# Patient Record
Sex: FEMALE | Race: WHITE | NOT HISPANIC OR LATINO | Employment: FULL TIME | URBAN - METROPOLITAN AREA
[De-identification: names, ages, dates, MRNs, and addresses within clinical notes are randomized per-mention and may not be internally consistent; named-entity substitution may affect disease eponyms.]

---
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
## 2018-05-15 ENCOUNTER — OFFICE VISIT (OUTPATIENT)
Dept: MEDICAL GROUP | Facility: PHYSICIAN GROUP | Age: 18
End: 2018-05-15
Payer: COMMERCIAL

## 2018-05-15 VITALS
TEMPERATURE: 97.2 F | HEIGHT: 68 IN | DIASTOLIC BLOOD PRESSURE: 80 MMHG | RESPIRATION RATE: 16 BRPM | WEIGHT: 155 LBS | BODY MASS INDEX: 23.49 KG/M2 | OXYGEN SATURATION: 98 % | HEART RATE: 105 BPM | SYSTOLIC BLOOD PRESSURE: 118 MMHG

## 2018-05-15 DIAGNOSIS — Z11.3 ROUTINE SCREENING FOR STI (SEXUALLY TRANSMITTED INFECTION): ICD-10-CM

## 2018-05-15 DIAGNOSIS — Z00.00 WELLNESS EXAMINATION: ICD-10-CM

## 2018-05-15 DIAGNOSIS — Z23 NEED FOR VACCINATION: ICD-10-CM

## 2018-05-15 PROCEDURE — 99395 PREV VISIT EST AGE 18-39: CPT | Mod: 25 | Performed by: PHYSICIAN ASSISTANT

## 2018-05-15 PROCEDURE — 90734 MENACWYD/MENACWYCRM VACC IM: CPT | Performed by: PHYSICIAN ASSISTANT

## 2018-05-15 PROCEDURE — 90471 IMMUNIZATION ADMIN: CPT | Performed by: PHYSICIAN ASSISTANT

## 2018-05-15 PROCEDURE — 90472 IMMUNIZATION ADMIN EACH ADD: CPT | Performed by: PHYSICIAN ASSISTANT

## 2018-05-15 PROCEDURE — 90621 MENB-FHBP VACC 2/3 DOSE IM: CPT | Performed by: PHYSICIAN ASSISTANT

## 2018-05-15 ASSESSMENT — PAIN SCALES - GENERAL: PAINLEVEL: NO PAIN

## 2018-05-15 ASSESSMENT — PATIENT HEALTH QUESTIONNAIRE - PHQ9
5. POOR APPETITE OR OVEREATING: 0 - NOT AT ALL
CLINICAL INTERPRETATION OF PHQ2 SCORE: 3
SUM OF ALL RESPONSES TO PHQ QUESTIONS 1-9: 5

## 2018-05-15 NOTE — PROGRESS NOTES
Chief Complaint   Patient presents with   • Annual Exam     check up       HISTORY OF PRESENT ILLNESS: Tawny Meyers is an established 18 y.o. female here to discuss the evaluation and management of:    Wellness examination  Patient is here today for her annual wellness examination. States she feels an overall good health and has no complaints. Patient is due for Trumenba and Menactra vaccinations. She is also due for chlamydia screening. States diet is healthy. Denies having regular exercise routine. States she is a sophomore in college and has passing grades. States she still following up with her psychiatrist regularly for depression and anxiety. She is seen by a psychiatrist, Dr. Thurman,  [facility] [place]. Is currently taking Effexor 225 mg po q night and Remeron 30 mg tablet q night. Medications are managed by psychiatrist. States she is compliant with medications. Admits that Remeron has caused weight gain. Patient has gained 25 lbs since last wellness exam on 17. Denies homicidal or suicidal ideation.      Patient Active Problem List    Diagnosis Date Noted   • Wellness examination 2017       Allergies:Patient has no known allergies.    Current Outpatient Prescriptions   Medication Sig Dispense Refill   • Venlafaxine HCl (EFFEXOR PO) Take 225 mg by mouth every day.     • mirtazapine (REMERON) 15 MG Tab Take 15 mg by mouth every evening.       No current facility-administered medications for this visit.        Social History   Substance Use Topics   • Smoking status: Never Smoker   • Smokeless tobacco: Never Used   • Alcohol use No       Family Status   Relation Status   • Mother Alive   • Father Alive   • Brother Alive   • Brother Alive   • Brother Alive   • Brother Alive   • Brother Alive   • Sister Alive   • Maternal Grandmother    • Maternal Grandfather    • Paternal Grandmother    • Paternal Grandfather      Family History   Problem Relation Age of Onset   • No  "Known Problems Mother    • Hypertension Father    • No Known Problems Brother    • No Known Problems Brother    • No Known Problems Brother    • No Known Problems Brother    • No Known Problems Brother    • Genetic Sister      Down Syndrome        ROS:  Review of Systems   Constitutional: Negative for fever, chills, weight loss and malaise/fatigue. Positive for weight gain.  HENT: Negative for ear pain, nosebleeds, congestion, sore throat and neck pain.    Eyes: Negative for blurred vision.   Respiratory: Negative for cough, sputum production, shortness of breath and wheezing.    Cardiovascular: Negative for chest pain, palpitations, orthopnea and leg swelling.   Gastrointestinal: Negative for heartburn, nausea, vomiting and abdominal pain.   Genitourinary: Negative for dysuria, urgency and frequency.   Musculoskeletal: Negative for myalgias, back pain and joint pain.   Skin: Negative for rash and itching.   Neurological: Negative for dizziness, tingling, tremors, sensory change, focal weakness and headaches.   Endo/Heme/Allergies: Does not bruise/bleed easily.   Psychiatric/Behavioral: Negative for suicidal ideas and memory loss.  The patient does not have insomnia.  Positive for depression and anxiety.  All other systems reviewed and are negative except as in HPI.    Exam: Blood pressure 118/80, pulse (!) 105, temperature 36.2 °C (97.2 °F), resp. rate 16, height 1.715 m (5' 7.5\"), weight 70.3 kg (155 lb), last menstrual period 04/24/2018, SpO2 98 %, not currently breastfeeding. Body mass index is 23.92 kg/m².  General: Normal appearing. No distress.  HEENT: Normocephalic. Eyes conjunctiva clear lids without ptosis, pupils equal and reactive to light accommodation, ears normal shape and contour, canals are clear bilaterally, tympanic membranes are benign, nasal mucosa benign, oropharynx is without erythema, edema or exudates.   Neck: Supple without JVD or bruit. Thyroid is not enlarged.  Pulmonary: Clear to " ausculation.  Normal effort. No rales, ronchi, or wheezing.  Cardiovascular: Regular rate and rhythm without murmur. Carotid pulses are intact and equal bilaterally.  Abdomen: Soft, nontender, nondistended. Normal bowel sounds. Liver and spleen are not palpable  Neurologic: Grossly nonfocal.  Cranial nerves are normal. LE DTR's normal and symmetric.  Lymph: No cervical, supraclavicular or axillary lymph nodes are palpable  Skin: Warm and dry.  No rashes or suspicious skin lesions.  Musculoskeletal: Normal gait. No extremity cyanosis, clubbing, or edema.  Psych: Normal mood and affect. Alert and oriented x3. Judgment and insight is normal.    Medical decision-making and discussion:  1. Wellness examination  Patient has gained 25 lbs since last wellness visit on 5/12/17. Patient is currently taking 30 mg every Remeron once daily. States medication has caused weight gain. Patient denies having a regular exercise routine. Emphasized the importance of diet and exercise with patient.  - Encouraged diet high in fruits, vegetables, and fiber. And a diet low in salt, refined carbohydrates, cholesterol, saturated fat, and trans fatty acids.    - Encouraged  a minimum of 30 minutes of moderate intensity aerobic exercise (eg, brisk walking) is recommended on five days each week. Or 20 minutes of vigorous-intensity aerobic exercise (eg, jogging) on three days each week.     Advised patient to continue following up with psychiatry as indicated. Continue current medication regimen for anxiety and depression. Denies homicidal or suicidal ideation. Discussed that should the patient have any symptoms they should call suicide prevention hotline or report to the emergency room immediately.    Patient states she is due for an annual eye exam and plans on scheduling appointment in the near future. States she has not had a dental exam in over a year. Discussed the importance of regular dental exams with patient.      Chlamydia/gonorrhea  lab work has been ordered for patient to complete. Patient will be contacted with results.    - CHLAMYDIA/GC PCR URINE OR SWAB; Future  - MENINGOCOCCAL CONJUGATE VACCINE 4-VALENT IM  - MENING VAC SERO B 2-3 DOSE SCHED IM    2. Routine screening for STI (sexually transmitted infection)  Discussed the importance of being screened for chlamydia/gonorrhea with patient. Lab work has been ordered. Patient will be contacted results. If they're abnormal findings patient will be treated at that time.  - CHLAMYDIA/GC PCR URINE OR SWAB; Future    3. Need for vaccination  A Menactra and Trumenba vaccination was administered to patient without complication. A VIS form was provided to patient.     - MENINGOCOCCAL CONJUGATE VACCINE 4-VALENT IM  - MENING VAC SERO B 2-3 DOSE SCHED IM      Please note that this dictation was created using voice recognition software. I have made every reasonable attempt to correct obvious errors, but I expect that there are errors of grammar and possibly content that I did not discover before finalizing the note.        Return in about 1 year (around 5/15/2019).

## 2018-05-15 NOTE — ASSESSMENT & PLAN NOTE
Patient is here today for her annual wellness examination. States she feels an overall good health and has no complaints. Patient is due for Trumenba and Menactra vaccinations. She is also due for chlamydia screening. States diet is healthy. Denies having regular exercise routine. States she is a sophomore in college and has passing grades. States she still following up with her psychiatrist regularly for depression and anxiety. She is seen by a psychiatrist, Dr. Thurman,  [facility] [place]. Is currently taking Effexor 225 mg po q night and Remeron 30 mg tablet q night. States she is compliant with medications and experiences no side effects or complications from medications. Medications are managed by psychiatrist. Denies homicidal or suicidal ideation.

## 2022-03-24 ENCOUNTER — NON-PROVIDER VISIT (OUTPATIENT)
Dept: URGENT CARE | Facility: CLINIC | Age: 22
End: 2022-03-24

## 2022-03-24 ENCOUNTER — OFFICE VISIT (OUTPATIENT)
Dept: URGENT CARE | Facility: CLINIC | Age: 22
End: 2022-03-24

## 2022-03-24 DIAGNOSIS — Z11.1 ENCOUNTER FOR PPD TEST: ICD-10-CM

## 2022-03-24 PROCEDURE — 86580 TB INTRADERMAL TEST: CPT | Performed by: PHYSICIAN ASSISTANT

## 2022-03-24 PROCEDURE — 99999 PR NO CHARGE: CPT | Performed by: PHYSICIAN ASSISTANT

## 2022-03-24 NOTE — NON-PROVIDER
Tawny Meyers is a 22 y.o. female here for a non-provider visit for PPD placement -- Step 1 of 1    Reason for PPD:  work requirement    1. TB evaluation questionnaire completed by patient? Yes      -  If any answers marked yes did you contact a provider prior to placing? No  2.  Patient notified to return to clinic for reading on: On Saturday, 03/26/2022 arrive after 2:41 pm or Sunday, 03/27/2022 arrive before 2:41pm.   3.  PPD Placement documentation completed on TB evaluation questionnaire? Yes  4.  Location of TB evaluation questionnaire filed: [facility] [address] [place] [address].   (note: If the urgent care site if closed for any reason, the patient can go to another urgent care to get it TB read).

## 2022-03-25 NOTE — NON-PROVIDER
.MAReason for PPD:  work requirement     1. TB evaluation questionnaire completed by patient? Yes      -  If any answers marked yes did you contact a provider prior to placing? No  2.  Patient notified to return to clinic for reading on: On Saturday, 03/26/2022 arrive after 2:41 pm or Sunday, 03/27/2022 arrive before 2:41pm.   3.  PPD Placement documentation completed on TB evaluation questionnaire? Yes  4.  Location of TB evaluation questionnaire filed: [facility] [address] [place] [address].   (note: If the urgent care site if closed for any reason, the patient can go to another urgent care to get it TB read).

## 2022-03-26 ENCOUNTER — NON-PROVIDER VISIT (OUTPATIENT)
Dept: URGENT CARE | Facility: CLINIC | Age: 22
End: 2022-03-26

## 2022-03-26 LAB — TB WHEAL 3D P 5 TU DIAM: NORMAL MM

## 2022-03-26 NOTE — NON-PROVIDER
Tawny Meyers is a 22 y.o. female here for a non-provider visit for PPD reading -- Step 1 of 1.      1.  Resulted in Epic under enter/edit results? Yes   2.  TB evaluation questionnaire scanned into chart and original given to patient?Yes    3. Was induration greater than 0 mm? No.      Routed to PCP? No

## 2024-03-19 ENCOUNTER — NON-PROVIDER VISIT (OUTPATIENT)
Dept: URGENT CARE | Facility: CLINIC | Age: 24
End: 2024-03-19

## 2024-03-19 DIAGNOSIS — Z11.1 PPD SCREENING TEST: ICD-10-CM

## 2024-03-19 PROCEDURE — 86580 TB INTRADERMAL TEST: CPT | Performed by: NURSE PRACTITIONER

## 2024-03-20 NOTE — NON-PROVIDER
Tawny Meyers is a 23 y.o. female here for a non-provider visit for PPD placement -- Step 1 of 1    Reason for PPD:  work requirement    1. TB evaluation questionnaire completed by patient? Yes      -  If any answers marked yes did you contact a provider prior to placing? Not Indicated  2.  Patient notified to return to clinic for reading on: 03/21/2024 after 5:09pm through 03/22/2024 before 5:09pm  3.  PPD Placement documentation completed on TB evaluation questionnaire? Yes  4.  Location of TB evaluation questionnaire filed: [facility] station

## 2024-03-21 ENCOUNTER — NON-PROVIDER VISIT (OUTPATIENT)
Dept: URGENT CARE | Facility: CLINIC | Age: 24
End: 2024-03-21

## 2024-06-18 ENCOUNTER — RESEARCH ENCOUNTER (OUTPATIENT)
Dept: RESEARCH | Facility: MEDICAL CENTER | Age: 24
End: 2024-06-18

## 2025-07-23 ENCOUNTER — APPOINTMENT (OUTPATIENT)
Dept: MEDICAL GROUP | Facility: MEDICAL CENTER | Age: 25
End: 2025-07-23

## 2025-07-23 VITALS
HEART RATE: 70 BPM | HEIGHT: 68 IN | BODY MASS INDEX: 22.72 KG/M2 | OXYGEN SATURATION: 100 % | WEIGHT: 149.91 LBS | SYSTOLIC BLOOD PRESSURE: 112 MMHG | TEMPERATURE: 98.6 F | DIASTOLIC BLOOD PRESSURE: 72 MMHG

## 2025-07-23 DIAGNOSIS — Z13.6 ENCOUNTER FOR LIPID SCREENING FOR CARDIOVASCULAR DISEASE: ICD-10-CM

## 2025-07-23 DIAGNOSIS — Z23 ENCOUNTER FOR IMMUNIZATION: ICD-10-CM

## 2025-07-23 DIAGNOSIS — S39.012D STRAIN OF LUMBAR REGION, SUBSEQUENT ENCOUNTER: ICD-10-CM

## 2025-07-23 DIAGNOSIS — Z13.228 ENCOUNTER FOR SCREENING FOR METABOLIC DISORDER: ICD-10-CM

## 2025-07-23 DIAGNOSIS — Z13.220 ENCOUNTER FOR LIPID SCREENING FOR CARDIOVASCULAR DISEASE: ICD-10-CM

## 2025-07-23 DIAGNOSIS — Z00.00 ENCOUNTER FOR PREVENTIVE CARE: Primary | ICD-10-CM

## 2025-07-23 PROBLEM — S39.012A STRAIN OF LUMBAR REGION: Status: ACTIVE | Noted: 2025-07-23

## 2025-07-23 PROCEDURE — 99395 PREV VISIT EST AGE 18-39: CPT | Mod: 25 | Performed by: STUDENT IN AN ORGANIZED HEALTH CARE EDUCATION/TRAINING PROGRAM

## 2025-07-23 PROCEDURE — 3074F SYST BP LT 130 MM HG: CPT | Performed by: STUDENT IN AN ORGANIZED HEALTH CARE EDUCATION/TRAINING PROGRAM

## 2025-07-23 PROCEDURE — 90471 IMMUNIZATION ADMIN: CPT | Performed by: STUDENT IN AN ORGANIZED HEALTH CARE EDUCATION/TRAINING PROGRAM

## 2025-07-23 PROCEDURE — 90621 MENB-FHBP VACC 2/3 DOSE IM: CPT | Mod: JZ | Performed by: STUDENT IN AN ORGANIZED HEALTH CARE EDUCATION/TRAINING PROGRAM

## 2025-07-23 PROCEDURE — 3078F DIAST BP <80 MM HG: CPT | Performed by: STUDENT IN AN ORGANIZED HEALTH CARE EDUCATION/TRAINING PROGRAM

## 2025-07-23 SDOH — ECONOMIC STABILITY: INCOME INSECURITY: IN THE LAST 12 MONTHS, WAS THERE A TIME WHEN YOU WERE NOT ABLE TO PAY THE MORTGAGE OR RENT ON TIME?: NO

## 2025-07-23 SDOH — ECONOMIC STABILITY: FOOD INSECURITY: WITHIN THE PAST 12 MONTHS, THE FOOD YOU BOUGHT JUST DIDN'T LAST AND YOU DIDN'T HAVE MONEY TO GET MORE.: NEVER TRUE

## 2025-07-23 SDOH — ECONOMIC STABILITY: FOOD INSECURITY: WITHIN THE PAST 12 MONTHS, YOU WORRIED THAT YOUR FOOD WOULD RUN OUT BEFORE YOU GOT MONEY TO BUY MORE.: NEVER TRUE

## 2025-07-23 SDOH — ECONOMIC STABILITY: TRANSPORTATION INSECURITY
IN THE PAST 12 MONTHS, HAS THE LACK OF TRANSPORTATION KEPT YOU FROM MEDICAL APPOINTMENTS OR FROM GETTING MEDICATIONS?: NO

## 2025-07-23 SDOH — ECONOMIC STABILITY: INCOME INSECURITY: HOW HARD IS IT FOR YOU TO PAY FOR THE VERY BASICS LIKE FOOD, HOUSING, MEDICAL CARE, AND HEATING?: NOT HARD AT ALL

## 2025-07-23 SDOH — HEALTH STABILITY: PHYSICAL HEALTH

## 2025-07-23 SDOH — ECONOMIC STABILITY: TRANSPORTATION INSECURITY
IN THE PAST 12 MONTHS, HAS LACK OF RELIABLE TRANSPORTATION KEPT YOU FROM MEDICAL APPOINTMENTS, MEETINGS, WORK OR FROM GETTING THINGS NEEDED FOR DAILY LIVING?: NO

## 2025-07-23 SDOH — ECONOMIC STABILITY: TRANSPORTATION INSECURITY
IN THE PAST 12 MONTHS, HAS LACK OF TRANSPORTATION KEPT YOU FROM MEETINGS, WORK, OR FROM GETTING THINGS NEEDED FOR DAILY LIVING?: NO

## 2025-07-23 SDOH — HEALTH STABILITY: MENTAL HEALTH

## 2025-07-23 SDOH — ECONOMIC STABILITY: HOUSING INSECURITY
IN THE LAST 12 MONTHS, WAS THERE A TIME WHEN YOU DID NOT HAVE A STEADY PLACE TO SLEEP OR SLEPT IN A SHELTER (INCLUDING NOW)?: NO

## 2025-07-23 ASSESSMENT — ENCOUNTER SYMPTOMS
FEVER: 0
VOMITING: 0
TREMORS: 0
COUGH: 0
HEMOPTYSIS: 0
SENSORY CHANGE: 0
ABDOMINAL PAIN: 0
DIARRHEA: 0
PALPITATIONS: 0
SPEECH CHANGE: 0
CHILLS: 0

## 2025-07-23 ASSESSMENT — SOCIAL DETERMINANTS OF HEALTH (SDOH)
HOW OFTEN DO YOU GET TOGETHER WITH FRIENDS OR RELATIVES?: TWICE A WEEK
HOW MANY DRINKS CONTAINING ALCOHOL DO YOU HAVE ON A TYPICAL DAY WHEN YOU ARE DRINKING: 1 OR 2
IN THE PAST 12 MONTHS, HAS THE ELECTRIC, GAS, OIL, OR WATER COMPANY THREATENED TO SHUT OFF SERVICE IN YOUR HOME?: NO
ARE YOU MARRIED, WIDOWED, DIVORCED, SEPARATED, NEVER MARRIED, OR LIVING WITH A PARTNER?: NEVER MARRIED
HOW OFTEN DO YOU HAVE SIX OR MORE DRINKS ON ONE OCCASION: NEVER
IN A TYPICAL WEEK, HOW MANY TIMES DO YOU TALK ON THE PHONE WITH FAMILY, FRIENDS, OR NEIGHBORS?: MORE THAN THREE TIMES A WEEK
ARE YOU MARRIED, WIDOWED, DIVORCED, SEPARATED, NEVER MARRIED, OR LIVING WITH A PARTNER?: NEVER MARRIED
HOW OFTEN DO YOU ATTEND CHURCH OR RELIGIOUS SERVICES?: NEVER
WITHIN THE PAST 12 MONTHS, YOU WORRIED THAT YOUR FOOD WOULD RUN OUT BEFORE YOU GOT THE MONEY TO BUY MORE: NEVER TRUE
IN A TYPICAL WEEK, HOW MANY TIMES DO YOU TALK ON THE PHONE WITH FAMILY, FRIENDS, OR NEIGHBORS?: MORE THAN THREE TIMES A WEEK
HOW OFTEN DO YOU ATTEND CHURCH OR RELIGIOUS SERVICES?: NEVER
HOW OFTEN DO YOU HAVE A DRINK CONTAINING ALCOHOL: 2-4 TIMES A MONTH
HOW HARD IS IT FOR YOU TO PAY FOR THE VERY BASICS LIKE FOOD, HOUSING, MEDICAL CARE, AND HEATING?: NOT HARD AT ALL
HOW OFTEN DO YOU GET TOGETHER WITH FRIENDS OR RELATIVES?: TWICE A WEEK

## 2025-07-23 ASSESSMENT — LIFESTYLE VARIABLES
SKIP TO QUESTIONS 9-10: 1
HOW OFTEN DO YOU HAVE SIX OR MORE DRINKS ON ONE OCCASION: NEVER
HOW OFTEN DO YOU HAVE A DRINK CONTAINING ALCOHOL: 2-4 TIMES A MONTH
AUDIT-C TOTAL SCORE: 2
HOW MANY STANDARD DRINKS CONTAINING ALCOHOL DO YOU HAVE ON A TYPICAL DAY: 1 OR 2

## 2025-07-23 ASSESSMENT — PATIENT HEALTH QUESTIONNAIRE - PHQ9: CLINICAL INTERPRETATION OF PHQ2 SCORE: 0

## 2025-07-23 NOTE — PROGRESS NOTES
Tawny Meyers is a 25 y.o. old female  who is here for annual and establish care    Chief Complaint   Patient presents with    Establish Care     New to You     Other     Patient would like to work on required vaccines// preventive labs       History of Present Illness  The patient presents for a new patient visit.    She was diagnosed with depression a couple of years ago and was treated with venlafaxine. She has been off medications and has not seen a psychiatrist for a couple of years, reporting improvement in her condition.     She experienced left hip pain for several months, which resolved after a few days of yoga and avoiding prolonged sitting and heavy lifting at work. She also reported numbness in her left leg last year, which was more pronounced than in her right leg. The numbness would increase when sitting and cause significant pain upon standing. She also reports mild lower back pain on the same side, which was present during her hip pain episodes.    Social History:  Occupations:       Ob-Gyn/ History:    Last Pap Smear:  Patient has not had a pap smear yet. No history of abnormal pap smears.  We will schedule her for Pap smear      GYNECOLOGICAL HISTORY:  Frequency and Flow: Regular, sometimes varying by a day or two      Health Maintenance  Below Anticipatory guidance discussed with patient  Cholesterol Screening: Obtain lipid panel  Diabetes Screening: Obtain CMP  Diet: Pescatarian diet   Exercise: Walking,  approximately 10,000 steps daily  Substance Abuse: none       Cancer screening  Colorectal Cancer Screening: n/a    Lung Cancer Screening: n/a    Breast Cancer Screening: n/a  Prostate Cancer Screening/PSA: n/a       Infectious disease screening/Immunizations  --HIV Screening: obtain   --Hepatitis C Screening: obtain   --Immunizations: Second dose of Trumenba today  Tdap up-to-date       Review of Systems   Constitutional:  Negative for chills and fever.   Respiratory:   "Negative for cough and hemoptysis.    Cardiovascular:  Negative for chest pain and palpitations.   Gastrointestinal:  Negative for abdominal pain, diarrhea and vomiting.   Genitourinary:  Negative for dysuria.   Neurological:  Negative for tremors, sensory change and speech change.        He  has a past medical history of Depression.  He  has no past surgical history on file.  Family History   Problem Relation Age of Onset    No Known Problems Mother     Hypertension Father     Genetic Disorder Sister         Down Syndrome     No Known Problems Brother     No Known Problems Brother     No Known Problems Brother     No Known Problems Brother     No Known Problems Brother     Diabetes Maternal Grandmother     Heart Disease Maternal Grandmother     Glaucoma Maternal Grandmother     Heart Disease Maternal Grandfather     Hypertension Maternal Grandfather     Pancreatic Cancer Paternal Grandmother      Social History[1]  Patient Active Problem List    Diagnosis Date Noted    Strain of lumbar region 07/23/2025    Wellness examination 05/12/2017     Current Medications[2] (including changes today)  Allergies: Patient has no active allergies.    /72 (BP Location: Left arm, Patient Position: Sitting, BP Cuff Size: Adult)   Pulse 70   Temp 37 °C (98.6 °F) (Temporal)   Ht 1.727 m (5' 8\")   Wt 68 kg (149 lb 14.6 oz)   SpO2 100%      Physical Exam  Constitutional:       Appearance: Normal appearance.   HENT:      Right Ear: Tympanic membrane, ear canal and external ear normal.      Left Ear: Tympanic membrane, ear canal and external ear normal.   Eyes:      General:         Right eye: No discharge.         Left eye: No discharge.      Extraocular Movements: Extraocular movements intact.      Pupils: Pupils are equal, round, and reactive to light.   Cardiovascular:      Rate and Rhythm: Normal rate and regular rhythm.      Pulses: Normal pulses.      Heart sounds: Normal heart sounds. No murmur heard.  Pulmonary:      " Effort: Pulmonary effort is normal. No respiratory distress.      Breath sounds: Normal breath sounds. No wheezing.   Abdominal:      Palpations: Abdomen is soft.      Tenderness: There is no abdominal tenderness. There is no guarding.   Skin:     General: Skin is warm.      Coloration: Skin is not jaundiced.   Neurological:      General: No focal deficit present.      Mental Status: She is alert and oriented to person, place, and time.   Psychiatric:         Mood and Affect: Mood normal.            Assessment and plan:  Problem List Items Addressed This Visit       Strain of lumbar region    - Resolved with stretching exercises  - Continue to monitor          Other Visit Diagnoses         Encounter for preventive care    -  Primary    Relevant Orders    HIV AG/AB COMBO ASSAY SCREENING    HEP C VIRUS ANTIBODY    CBC WITH DIFFERENTIAL    VITAMIN D,25 HYDROXY (DEFICIENCY)      Encounter for lipid screening for cardiovascular disease        Relevant Orders    Lipid Profile      Encounter for screening for metabolic disorder        Relevant Orders    Comp Metabolic Panel    TSH WITH REFLEX TO FT4      Encounter for immunization        Relevant Orders    Meningococcal (IM) Group B (Completed)                   Return if symptoms worsen or fail to improve.        Please note that this dictation was created using voice recognition software. I have made every reasonable attempt to correct obvious errors, but I expect that there are errors of grammar and possibly content that I did not discover before finalizing the note.               [1]   Social History  Tobacco Use    Smoking status: Never    Smokeless tobacco: Never   Vaping Use    Vaping status: Never Used   Substance Use Topics    Alcohol use: Yes     Alcohol/week: 1.2 oz     Types: 2 Standard drinks or equivalent per week    Drug use: No   [2]   No current outpatient medications on file.     No current facility-administered medications for this visit.

## 2025-07-25 ENCOUNTER — OFFICE VISIT (OUTPATIENT)
Dept: MEDICAL GROUP | Facility: MEDICAL CENTER | Age: 25
End: 2025-07-25
Payer: COMMERCIAL

## 2025-07-25 ENCOUNTER — HOSPITAL ENCOUNTER (OUTPATIENT)
Facility: MEDICAL CENTER | Age: 25
End: 2025-07-25
Attending: NURSE PRACTITIONER
Payer: COMMERCIAL

## 2025-07-25 VITALS
BODY MASS INDEX: 22.84 KG/M2 | SYSTOLIC BLOOD PRESSURE: 126 MMHG | OXYGEN SATURATION: 99 % | WEIGHT: 150.68 LBS | TEMPERATURE: 97.9 F | HEIGHT: 68 IN | DIASTOLIC BLOOD PRESSURE: 60 MMHG | HEART RATE: 91 BPM

## 2025-07-25 DIAGNOSIS — Z01.419 WELL FEMALE EXAM WITH ROUTINE GYNECOLOGICAL EXAM: Primary | ICD-10-CM

## 2025-07-25 DIAGNOSIS — Z11.51 SCREENING FOR HPV (HUMAN PAPILLOMAVIRUS): ICD-10-CM

## 2025-07-25 DIAGNOSIS — Z01.419 WELL FEMALE EXAM WITH ROUTINE GYNECOLOGICAL EXAM: ICD-10-CM

## 2025-07-25 PROCEDURE — 88142 CYTOPATH C/V THIN LAYER: CPT

## 2025-07-25 PROCEDURE — 3078F DIAST BP <80 MM HG: CPT | Performed by: NURSE PRACTITIONER

## 2025-07-25 PROCEDURE — 99395 PREV VISIT EST AGE 18-39: CPT | Performed by: NURSE PRACTITIONER

## 2025-07-25 PROCEDURE — 3074F SYST BP LT 130 MM HG: CPT | Performed by: NURSE PRACTITIONER

## 2025-07-25 PROCEDURE — 87491 CHLMYD TRACH DNA AMP PROBE: CPT

## 2025-07-25 PROCEDURE — 87591 N.GONORRHOEAE DNA AMP PROB: CPT

## 2025-07-25 NOTE — PROGRESS NOTES
Established Patient    CC: Tawny Meyers is a 25 y.o.,female who presents today for Pap and pelvic exam. No complaints today.    HPI:       History of Present Illness      Her LMP was 2 wks ago.    She is having regular menses.   Her menstrual history is unremarkable.   Her form of contraception is  none  Hx of abnormal pelvic exams:  No .   Last Pap never.  Hx of abnormal Pap no  OB History   No obstetric history on file.      Social History     Substance and Sexual Activity   Sexual Activity Not Currently    Partners: Male    Birth control/protection: Abstinence     Sexual history: single partner   She  reports that she has never smoked. She has never used smokeless tobacco.  Patient denies any current symptoms: No dyspareunia, no itching, no discharge, and no lesions.  Patient denies fam Hx of GYN or breast cancers.  She is , P:0.   Previous pregnancies, without significant complications.   Patient denies any STD risks or concerns.   Patient denies any history of sexual abuse.   Patient denies breast masses or lesions .  Last mammo:  (due at 38 )    No problem-specific Assessment & Plan notes found for this encounter.          Allergies: Patient has no active allergies.    Current Medications[1]  Patient Active Problem List    Diagnosis Date Noted    Strain of lumbar region 2025    Wellness examination 2017     Medications Ordered Prior to Encounter[2]  Family History   Problem Relation Age of Onset    No Known Problems Mother     Hypertension Father     Genetic Disorder Sister         Down Syndrome     No Known Problems Brother     No Known Problems Brother     No Known Problems Brother     No Known Problems Brother     No Known Problems Brother     Diabetes Maternal Grandmother     Heart Disease Maternal Grandmother     Glaucoma Maternal Grandmother     Heart Disease Maternal Grandfather     Hypertension Maternal Grandfather     Pancreatic Cancer Paternal Grandmother      Social History  "    Tobacco Use    Smoking status: Never    Smokeless tobacco: Never   Substance Use Topics    Alcohol use: Yes     Alcohol/week: 1.2 oz     Types: 2 Standard drinks or equivalent per week       Allergies, past medical history, past surgical history, medications, family history, social history reviewed and updated.    Exercise: moderate regular exercise program  Her preventative health screens are up to date.    ROS:  Constitutional: Denies fevers or chills  Eyes: Denies changes in vision  Ears/Nose/Throat/Mouth: Denies nasal congestion or sore throat   Cardiovascular: Denies chest pain or palpitations   Respiratory: Denies shortness of breath, Denies cough  Gastrointestinal/Hepatic: Denies abdominal pain, nausea, vomiting   Genitourinary: Denies dysuria or frequency  Musculoskeletal/Rheum: Denies joint pain and swelling   Neurological: Denies headache or visual changes  Psychiatric: Denies mood disorder   Endocrine: Denies hx of diabetes or thyroid dysfunction  Heme/Oncology/Lymph Nodes: Denies weight changes or enlarged LNs.    GYN ROS:      Normal menses.  Cramping is mild.   She does not take OTC analgesics for cramping  No significant bloating/fluid retention, no pelvic pain, or dyspareunia. No abnormal bleeding or vaginal discharge.   No breast pain or tenderness, no new or enlarging lumps on elf-exam, nipple discharge, changes in size or contour, or abnormal cyclic discomfort.  No urinary tract symptoms, no incontinence, no polydipsia, polyuria,  No abdominal pain, change in bowel habits, black or bloody stools.    No menstrual migraines   No depression, labile mood, anxiety, libido changes, insomnia.  No temperature intolerance.    /60   Pulse 91   Temp 36.6 °C (97.9 °F) (Temporal)   Ht 1.727 m (5' 8\")   Wt 68.3 kg (150 lb 11 oz)   SpO2 99%   BMI 22.91 kg/m²   Body mass index is 22.91 kg/m².  Vitals Noted and Reviewed    Physical Exam    Physical Exam  Constitutional:       General: She is not in " acute distress.     Appearance: She is not ill-appearing.   HENT:      Head: Normocephalic and atraumatic.      Nose: Nose normal.   Eyes:      General:         Right eye: No discharge.         Left eye: No discharge.   Cardiovascular:      Rate and Rhythm: Normal rate.      Pulses: Normal pulses.      Heart sounds: Normal heart sounds. No murmur heard.  Pulmonary:      Effort: Pulmonary effort is normal. No respiratory distress.      Breath sounds: Normal breath sounds. No stridor. No wheezing or rales.   Skin:     Findings: No rash.   Neurological:      General: No focal deficit present.      Mental Status: She is alert. Mental status is at baseline.   Psychiatric:         Mood and Affect: Mood normal.         Behavior: Behavior normal.          Pelvic exam:   External genitalia are normal in appearance, no lesions.   Vulva: grossly unremarkable, no lesions or masses noted  Vagina: no abnormal discharge, normal color  Speculum exam:  Vaginal Mucosa: normal vaginal mucosa  Cervix: parous, normal cervix and mucosa, no lesions, non-friable. No cervical motion tenderness.  Pap performed and sample collected and sent to lab.  Uterus: Normal shape, position and consistency, normal in size, no masses.  Bimanual exam: No uteromegaly, negative chandelier sign, adnexa freely movable and without enlargements bilaterally.  Rectal: not performed  Breast exam: Bilateral breasts are normal in appearance. Nipple and areola are normal in appearance. No nipple retraction. No abnormal skin texture. No dominant masses. No axillary lymphadenopathy, no skin changes.   Breasts: breasts symmetric, no dominant or suspicious mass, no skin or nipple changes, and no axillary adenopathy    A chaperone was offered to the patient during today's exam. Chaperone name: Andree was present.    Assessment and Plan  NormalGYN Exam  pap smear  return annually or prn  Pap was processed and sent to the lab.    Tawny was seen today for gynecologic  exam.    Diagnoses and all orders for this visit:    Well female exam with routine gynecological exam  -     THINPREP RFLX HPV ASCUS W/CTNG; Future    Screening for HPV (human papillomavirus)  -     THINPREP RFLX HPV ASCUS W/CTNG; Future          Assessment & Plan           Plan:   pap smear  return annually or prn  Discussed  breast self exam, feminine hygiene, diet and exercise, Kegel exercises     Discussed  breast self exam, diet and exercise, different methods of contraception   Follow-up in one year for annual physical.    This note was created using voice recognition software. There may be unintended errors in spelling, grammar or content.                             [1]   No current outpatient medications on file.     No current facility-administered medications for this visit.   [2]   No current outpatient medications on file prior to visit.     No current facility-administered medications on file prior to visit.

## 2025-07-26 LAB
C TRACH DNA GENITAL QL NAA+PROBE: NEGATIVE
N GONORRHOEA DNA GENITAL QL NAA+PROBE: NEGATIVE
SPECIMEN SOURCE: NORMAL

## 2025-07-30 LAB — THINPREP PAP, CYTOLOGY NL11781: NORMAL

## 2025-08-02 ENCOUNTER — HOSPITAL ENCOUNTER (OUTPATIENT)
Dept: LAB | Facility: MEDICAL CENTER | Age: 25
End: 2025-08-02
Attending: STUDENT IN AN ORGANIZED HEALTH CARE EDUCATION/TRAINING PROGRAM
Payer: COMMERCIAL

## 2025-08-02 DIAGNOSIS — Z13.6 ENCOUNTER FOR LIPID SCREENING FOR CARDIOVASCULAR DISEASE: ICD-10-CM

## 2025-08-02 DIAGNOSIS — Z13.228 ENCOUNTER FOR SCREENING FOR METABOLIC DISORDER: ICD-10-CM

## 2025-08-02 DIAGNOSIS — Z13.220 ENCOUNTER FOR LIPID SCREENING FOR CARDIOVASCULAR DISEASE: ICD-10-CM

## 2025-08-02 DIAGNOSIS — Z00.00 ENCOUNTER FOR PREVENTIVE CARE: ICD-10-CM

## 2025-08-02 LAB
25(OH)D3 SERPL-MCNC: 20 NG/ML (ref 30–100)
ALBUMIN SERPL BCP-MCNC: 4.7 G/DL (ref 3.2–4.9)
ALBUMIN/GLOB SERPL: 1.7 G/DL
ALP SERPL-CCNC: 62 U/L (ref 30–99)
ALT SERPL-CCNC: 15 U/L (ref 2–50)
ANION GAP SERPL CALC-SCNC: 11 MMOL/L (ref 7–16)
AST SERPL-CCNC: 18 U/L (ref 12–45)
BASOPHILS # BLD AUTO: 0.2 % (ref 0–1.8)
BASOPHILS # BLD: 0.01 K/UL (ref 0–0.12)
BILIRUB SERPL-MCNC: 0.3 MG/DL (ref 0.1–1.5)
BUN SERPL-MCNC: 11 MG/DL (ref 8–22)
CALCIUM ALBUM COR SERPL-MCNC: 8.8 MG/DL (ref 8.5–10.5)
CALCIUM SERPL-MCNC: 9.4 MG/DL (ref 8.5–10.5)
CHLORIDE SERPL-SCNC: 105 MMOL/L (ref 96–112)
CHOLEST SERPL-MCNC: 139 MG/DL (ref 100–199)
CO2 SERPL-SCNC: 23 MMOL/L (ref 20–33)
CREAT SERPL-MCNC: 0.81 MG/DL (ref 0.5–1.4)
EOSINOPHIL # BLD AUTO: 0.13 K/UL (ref 0–0.51)
EOSINOPHIL NFR BLD: 2.7 % (ref 0–6.9)
ERYTHROCYTE [DISTWIDTH] IN BLOOD BY AUTOMATED COUNT: 40.9 FL (ref 35.9–50)
FASTING STATUS PATIENT QL REPORTED: NORMAL
GFR SERPLBLD CREATININE-BSD FMLA CKD-EPI: 102 ML/MIN/1.73 M 2
GLOBULIN SER CALC-MCNC: 2.8 G/DL (ref 1.9–3.5)
GLUCOSE SERPL-MCNC: 102 MG/DL (ref 65–99)
HCT VFR BLD AUTO: 40.8 % (ref 37–47)
HCV AB SER QL: NORMAL
HDLC SERPL-MCNC: 47 MG/DL
HGB BLD-MCNC: 13.4 G/DL (ref 12–16)
HIV 1+2 AB+HIV1 P24 AG SERPL QL IA: NORMAL
IMM GRANULOCYTES # BLD AUTO: 0.01 K/UL (ref 0–0.11)
IMM GRANULOCYTES NFR BLD AUTO: 0.2 % (ref 0–0.9)
LDLC SERPL CALC-MCNC: 62 MG/DL
LYMPHOCYTES # BLD AUTO: 1.85 K/UL (ref 1–4.8)
LYMPHOCYTES NFR BLD: 37.8 % (ref 22–41)
MCH RBC QN AUTO: 28.4 PG (ref 27–33)
MCHC RBC AUTO-ENTMCNC: 32.8 G/DL (ref 32.2–35.5)
MCV RBC AUTO: 86.4 FL (ref 81.4–97.8)
MONOCYTES # BLD AUTO: 0.29 K/UL (ref 0–0.85)
MONOCYTES NFR BLD AUTO: 5.9 % (ref 0–13.4)
NEUTROPHILS # BLD AUTO: 2.6 K/UL (ref 1.82–7.42)
NEUTROPHILS NFR BLD: 53.2 % (ref 44–72)
NRBC # BLD AUTO: 0 K/UL
NRBC BLD-RTO: 0 /100 WBC (ref 0–0.2)
PLATELET # BLD AUTO: 287 K/UL (ref 164–446)
PMV BLD AUTO: 10.1 FL (ref 9–12.9)
POTASSIUM SERPL-SCNC: 4.2 MMOL/L (ref 3.6–5.5)
PROT SERPL-MCNC: 7.5 G/DL (ref 6–8.2)
RBC # BLD AUTO: 4.72 M/UL (ref 4.2–5.4)
SODIUM SERPL-SCNC: 139 MMOL/L (ref 135–145)
TRIGL SERPL-MCNC: 149 MG/DL (ref 0–149)
TSH SERPL DL<=0.005 MIU/L-ACNC: 3.75 UIU/ML (ref 0.38–5.33)
WBC # BLD AUTO: 4.9 K/UL (ref 4.8–10.8)

## 2025-08-02 PROCEDURE — 86803 HEPATITIS C AB TEST: CPT

## 2025-08-02 PROCEDURE — 80061 LIPID PANEL: CPT

## 2025-08-02 PROCEDURE — 84443 ASSAY THYROID STIM HORMONE: CPT

## 2025-08-02 PROCEDURE — 82306 VITAMIN D 25 HYDROXY: CPT

## 2025-08-02 PROCEDURE — 36415 COLL VENOUS BLD VENIPUNCTURE: CPT

## 2025-08-02 PROCEDURE — 80053 COMPREHEN METABOLIC PANEL: CPT

## 2025-08-02 PROCEDURE — 85025 COMPLETE CBC W/AUTO DIFF WBC: CPT

## 2025-08-02 PROCEDURE — 87389 HIV-1 AG W/HIV-1&-2 AB AG IA: CPT
